# Patient Record
Sex: FEMALE | Race: WHITE
[De-identification: names, ages, dates, MRNs, and addresses within clinical notes are randomized per-mention and may not be internally consistent; named-entity substitution may affect disease eponyms.]

---

## 2020-08-28 ENCOUNTER — HOSPITAL ENCOUNTER (INPATIENT)
Dept: HOSPITAL 84 - D.ER | Age: 50
LOS: 4 days | Discharge: HOME | DRG: 90 | End: 2020-09-01
Attending: FAMILY MEDICINE | Admitting: FAMILY MEDICINE
Payer: COMMERCIAL

## 2020-08-28 VITALS
WEIGHT: 145.3 LBS | HEIGHT: 68 IN | BODY MASS INDEX: 22.02 KG/M2 | BODY MASS INDEX: 22.02 KG/M2 | HEIGHT: 68 IN | WEIGHT: 145.3 LBS

## 2020-08-28 DIAGNOSIS — Z91.81: ICD-10-CM
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
## 2020-08-28 DIAGNOSIS — R73.9: ICD-10-CM

## 2020-08-28 DIAGNOSIS — S06.0X9A: Primary | ICD-10-CM

## 2020-08-28 DIAGNOSIS — I10: ICD-10-CM

## 2020-08-28 DIAGNOSIS — S16.1XXA: ICD-10-CM

## 2020-08-28 DIAGNOSIS — M48.02: ICD-10-CM

## 2020-08-28 DIAGNOSIS — S01.01XA: ICD-10-CM

## 2020-08-29 VITALS — SYSTOLIC BLOOD PRESSURE: 142 MMHG | DIASTOLIC BLOOD PRESSURE: 84 MMHG

## 2020-08-29 VITALS — DIASTOLIC BLOOD PRESSURE: 92 MMHG | SYSTOLIC BLOOD PRESSURE: 152 MMHG

## 2020-08-29 VITALS — SYSTOLIC BLOOD PRESSURE: 138 MMHG | DIASTOLIC BLOOD PRESSURE: 84 MMHG

## 2020-08-29 VITALS — SYSTOLIC BLOOD PRESSURE: 152 MMHG | DIASTOLIC BLOOD PRESSURE: 92 MMHG

## 2020-08-29 VITALS — DIASTOLIC BLOOD PRESSURE: 86 MMHG | SYSTOLIC BLOOD PRESSURE: 132 MMHG

## 2020-08-29 VITALS — DIASTOLIC BLOOD PRESSURE: 87 MMHG | SYSTOLIC BLOOD PRESSURE: 150 MMHG

## 2020-08-29 VITALS — SYSTOLIC BLOOD PRESSURE: 154 MMHG | DIASTOLIC BLOOD PRESSURE: 94 MMHG

## 2020-08-29 VITALS — SYSTOLIC BLOOD PRESSURE: 158 MMHG | DIASTOLIC BLOOD PRESSURE: 90 MMHG

## 2020-08-29 VITALS — SYSTOLIC BLOOD PRESSURE: 146 MMHG | DIASTOLIC BLOOD PRESSURE: 93 MMHG

## 2020-08-29 LAB
ALBUMIN SERPL-MCNC: 3.9 G/DL (ref 3.4–5)
ALP SERPL-CCNC: 55 U/L (ref 30–120)
ALT SERPL-CCNC: 29 U/L (ref 10–68)
ANION GAP SERPL CALC-SCNC: 14 MMOL/L (ref 8–16)
APTT BLD: 25.2 SECONDS (ref 22.8–39.4)
BASOPHILS NFR BLD AUTO: 0.3 % (ref 0–2)
BILIRUB SERPL-MCNC: 0.38 MG/DL (ref 0.2–1.3)
BUN SERPL-MCNC: 13 MG/DL (ref 7–18)
CALCIUM SERPL-MCNC: 7.8 MG/DL (ref 8.5–10.1)
CHLORIDE SERPL-SCNC: 101 MMOL/L (ref 98–107)
CO2 SERPL-SCNC: 25.1 MMOL/L (ref 21–32)
CREAT SERPL-MCNC: 0.7 MG/DL (ref 0.6–1.3)
EOSINOPHIL NFR BLD: 1 % (ref 0–7)
ERYTHROCYTE [DISTWIDTH] IN BLOOD BY AUTOMATED COUNT: 12.7 % (ref 11.5–14.5)
GLOBULIN SER-MCNC: 3.4 G/L
GLUCOSE SERPL-MCNC: 99 MG/DL (ref 74–106)
HCT VFR BLD CALC: 36.2 % (ref 36–48)
HGB BLD-MCNC: 12.2 G/DL (ref 12–16)
IMM GRANULOCYTES NFR BLD: 0.3 % (ref 0–5)
INR PPP: 0.91 (ref 0.85–1.17)
LYMPHOCYTES NFR BLD AUTO: 28 % (ref 15–50)
MCH RBC QN AUTO: 33.4 PG (ref 26–34)
MCHC RBC AUTO-ENTMCNC: 33.7 G/DL (ref 31–37)
MCV RBC: 99.2 FL (ref 80–100)
MONOCYTES NFR BLD: 13.4 % (ref 2–11)
NEUTROPHILS NFR BLD AUTO: 57 % (ref 40–80)
OSMOLALITY SERPL CALC.SUM OF ELEC: 271 MOSM/KG (ref 275–300)
PLATELET # BLD: 287 10X3/UL (ref 130–400)
PMV BLD AUTO: 9.2 FL (ref 7.4–10.4)
POTASSIUM SERPL-SCNC: 4.1 MMOL/L (ref 3.5–5.1)
PROT SERPL-MCNC: 7.3 G/DL (ref 6.4–8.2)
PROTHROMBIN TIME: 12.2 SECONDS (ref 11.6–15)
RBC # BLD AUTO: 3.65 10X6/UL (ref 4–5.4)
SODIUM SERPL-SCNC: 136 MMOL/L (ref 136–145)
WBC # BLD AUTO: 7.9 10X3/UL (ref 4.8–10.8)

## 2020-08-29 PROCEDURE — 0HQ0XZZ REPAIR SCALP SKIN, EXTERNAL APPROACH: ICD-10-PCS | Performed by: FAMILY MEDICINE

## 2020-08-29 NOTE — NUR
RCV`D PT FROM ER VIA WHEELCHAIR AND HOSPITAL STAFF. ALERT AND ORIENTED WITH NO
CURRENT S/S OF DISTRESS. V/S STABLE. DENIES CURRENT NEEDS, WILL CONT TO
MONITOR.

## 2020-08-29 NOTE — NUR
CALL TO DR CONWAY FOR PAIN MED ON PT . MORPHINE NOT HOLDING HER , DR CONWAY STOPPED
THE MORPHINE AND ADDED DILAUDID Q 4 HOURS PRN PAIN.

## 2020-08-30 VITALS — DIASTOLIC BLOOD PRESSURE: 87 MMHG | SYSTOLIC BLOOD PRESSURE: 144 MMHG

## 2020-08-30 VITALS — SYSTOLIC BLOOD PRESSURE: 159 MMHG | DIASTOLIC BLOOD PRESSURE: 89 MMHG

## 2020-08-30 VITALS — DIASTOLIC BLOOD PRESSURE: 85 MMHG | SYSTOLIC BLOOD PRESSURE: 140 MMHG

## 2020-08-30 VITALS — DIASTOLIC BLOOD PRESSURE: 86 MMHG | SYSTOLIC BLOOD PRESSURE: 141 MMHG

## 2020-08-30 VITALS — SYSTOLIC BLOOD PRESSURE: 146 MMHG | DIASTOLIC BLOOD PRESSURE: 84 MMHG

## 2020-08-30 LAB
ANION GAP SERPL CALC-SCNC: 13.1 MMOL/L (ref 8–16)
BASOPHILS NFR BLD AUTO: 0.1 % (ref 0–2)
BUN SERPL-MCNC: 10 MG/DL (ref 7–18)
CALCIUM SERPL-MCNC: 8 MG/DL (ref 8.5–10.1)
CHLORIDE SERPL-SCNC: 102 MMOL/L (ref 98–107)
CO2 SERPL-SCNC: 23 MMOL/L (ref 21–32)
CREAT SERPL-MCNC: 0.9 MG/DL (ref 0.6–1.3)
EOSINOPHIL NFR BLD: 0 % (ref 0–7)
ERYTHROCYTE [DISTWIDTH] IN BLOOD BY AUTOMATED COUNT: 13.2 % (ref 11.5–14.5)
GLUCOSE SERPL-MCNC: 198 MG/DL (ref 74–106)
HCT VFR BLD CALC: 34.6 % (ref 36–48)
HGB BLD-MCNC: 11.6 G/DL (ref 12–16)
IMM GRANULOCYTES NFR BLD: 0.3 % (ref 0–5)
LYMPHOCYTES NFR BLD AUTO: 6 % (ref 15–50)
MAGNESIUM SERPL-MCNC: 2.1 MG/DL (ref 1.8–2.4)
MCH RBC QN AUTO: 33.7 PG (ref 26–34)
MCHC RBC AUTO-ENTMCNC: 33.5 G/DL (ref 31–37)
MCV RBC: 100.6 FL (ref 80–100)
MONOCYTES NFR BLD: 6.6 % (ref 2–11)
NEUTROPHILS NFR BLD AUTO: 87 % (ref 40–80)
OSMOLALITY SERPL CALC.SUM OF ELEC: 272 MOSM/KG (ref 275–300)
PLATELET # BLD: 281 10X3/UL (ref 130–400)
PMV BLD AUTO: 9.4 FL (ref 7.4–10.4)
POTASSIUM SERPL-SCNC: 4.1 MMOL/L (ref 3.5–5.1)
RBC # BLD AUTO: 3.44 10X6/UL (ref 4–5.4)
SODIUM SERPL-SCNC: 134 MMOL/L (ref 136–145)
WBC # BLD AUTO: 10.3 10X3/UL (ref 4.8–10.8)

## 2020-08-30 NOTE — NUR
A&O RESTING IN BED WITH EYES OPEN. NO C/O PAIN, DILAUDID PCA MANAGING PAIN AT
THIS TIME. NO S/S OF ACUTE DISTRESS NOTED. RENATA TO POSTERIOR CRAINIUM,
C/D/I. C-COLLAR ON. IV TO LEFT AC, NS INFUSING @ 75ML/HR. SITE PATENT WITHOUT
REDNESS OR SWELLING. DENIES ANY NEEDS AT THIS TIME. CALL LIGHT IN REACH. WILL
CONTINUE TO MONITOR.

## 2020-08-31 VITALS — DIASTOLIC BLOOD PRESSURE: 92 MMHG | SYSTOLIC BLOOD PRESSURE: 190 MMHG

## 2020-08-31 VITALS — SYSTOLIC BLOOD PRESSURE: 152 MMHG | DIASTOLIC BLOOD PRESSURE: 91 MMHG

## 2020-08-31 VITALS — SYSTOLIC BLOOD PRESSURE: 137 MMHG | DIASTOLIC BLOOD PRESSURE: 84 MMHG

## 2020-08-31 VITALS — SYSTOLIC BLOOD PRESSURE: 148 MMHG | DIASTOLIC BLOOD PRESSURE: 67 MMHG

## 2020-08-31 VITALS — DIASTOLIC BLOOD PRESSURE: 81 MMHG | SYSTOLIC BLOOD PRESSURE: 147 MMHG

## 2020-08-31 VITALS — SYSTOLIC BLOOD PRESSURE: 156 MMHG | DIASTOLIC BLOOD PRESSURE: 74 MMHG

## 2020-08-31 LAB
ANION GAP SERPL CALC-SCNC: 8.4 MMOL/L (ref 8–16)
BASOPHILS NFR BLD AUTO: 0 % (ref 0–2)
BUN SERPL-MCNC: 12 MG/DL (ref 7–18)
CALCIUM SERPL-MCNC: 8.6 MG/DL (ref 8.5–10.1)
CHLORIDE SERPL-SCNC: 103 MMOL/L (ref 98–107)
CO2 SERPL-SCNC: 29.7 MMOL/L (ref 21–32)
CREAT SERPL-MCNC: 0.7 MG/DL (ref 0.6–1.3)
EOSINOPHIL NFR BLD: 0 % (ref 0–7)
ERYTHROCYTE [DISTWIDTH] IN BLOOD BY AUTOMATED COUNT: 13.6 % (ref 11.5–14.5)
GLUCOSE SERPL-MCNC: 115 MG/DL (ref 74–106)
HCT VFR BLD CALC: 35.8 % (ref 36–48)
HGB BLD-MCNC: 11.4 G/DL (ref 12–16)
IMM GRANULOCYTES NFR BLD: 0.4 % (ref 0–5)
LYMPHOCYTES NFR BLD AUTO: 7.1 % (ref 15–50)
MAGNESIUM SERPL-MCNC: 2.2 MG/DL (ref 1.8–2.4)
MCH RBC QN AUTO: 32.5 PG (ref 26–34)
MCHC RBC AUTO-ENTMCNC: 31.8 G/DL (ref 31–37)
MCV RBC: 102 FL (ref 80–100)
MONOCYTES NFR BLD: 8.6 % (ref 2–11)
NEUTROPHILS NFR BLD AUTO: 83.9 % (ref 40–80)
OSMOLALITY SERPL CALC.SUM OF ELEC: 274 MOSM/KG (ref 275–300)
PLATELET # BLD: 287 10X3/UL (ref 130–400)
PMV BLD AUTO: 9.5 FL (ref 7.4–10.4)
POTASSIUM SERPL-SCNC: 4.1 MMOL/L (ref 3.5–5.1)
RBC # BLD AUTO: 3.51 10X6/UL (ref 4–5.4)
SODIUM SERPL-SCNC: 137 MMOL/L (ref 136–145)
WBC # BLD AUTO: 12.8 10X3/UL (ref 4.8–10.8)

## 2020-08-31 NOTE — NUR
CALL TO DR YULI CHOWDHURY. PATIENT WANTS TO FINISH PCA VIAL IN PAIN PUMP BEFORE MED
DCD.ORDERS RECIEVED AND INITIATED

## 2020-08-31 NOTE — NUR
ASSESSMENT PER FLOW SHEET. PATIENT IS WITHOUT DISTRESS,BUT COMPLAINS OF PAIN
10/10 SCALE. MEDS PER MAR.CALL LIGHT IN REACH

## 2020-09-01 VITALS — DIASTOLIC BLOOD PRESSURE: 86 MMHG | SYSTOLIC BLOOD PRESSURE: 155 MMHG

## 2020-09-01 VITALS — SYSTOLIC BLOOD PRESSURE: 167 MMHG | DIASTOLIC BLOOD PRESSURE: 86 MMHG

## 2020-09-01 LAB
ANION GAP SERPL CALC-SCNC: 9.7 MMOL/L (ref 8–16)
BASOPHILS NFR BLD AUTO: 0 % (ref 0–2)
BUN SERPL-MCNC: 14 MG/DL (ref 7–18)
CALCIUM SERPL-MCNC: 8.1 MG/DL (ref 8.5–10.1)
CHLORIDE SERPL-SCNC: 104 MMOL/L (ref 98–107)
CO2 SERPL-SCNC: 26.1 MMOL/L (ref 21–32)
CREAT SERPL-MCNC: 0.7 MG/DL (ref 0.6–1.3)
EOSINOPHIL NFR BLD: 0 % (ref 0–7)
ERYTHROCYTE [DISTWIDTH] IN BLOOD BY AUTOMATED COUNT: 13.1 % (ref 11.5–14.5)
GLUCOSE SERPL-MCNC: 134 MG/DL (ref 74–106)
HCT VFR BLD CALC: 32.8 % (ref 36–48)
HGB BLD-MCNC: 10.6 G/DL (ref 12–16)
IMM GRANULOCYTES NFR BLD: 0.3 % (ref 0–5)
LYMPHOCYTES NFR BLD AUTO: 6.6 % (ref 15–50)
MAGNESIUM SERPL-MCNC: 2 MG/DL (ref 1.8–2.4)
MCH RBC QN AUTO: 32.7 PG (ref 26–34)
MCHC RBC AUTO-ENTMCNC: 32.3 G/DL (ref 31–37)
MCV RBC: 101.2 FL (ref 80–100)
MONOCYTES NFR BLD: 7.6 % (ref 2–11)
NEUTROPHILS NFR BLD AUTO: 85.5 % (ref 40–80)
OSMOLALITY SERPL CALC.SUM OF ELEC: 274 MOSM/KG (ref 275–300)
PLATELET # BLD: 264 10X3/UL (ref 130–400)
PMV BLD AUTO: 9.6 FL (ref 7.4–10.4)
POTASSIUM SERPL-SCNC: 3.8 MMOL/L (ref 3.5–5.1)
RBC # BLD AUTO: 3.24 10X6/UL (ref 4–5.4)
SODIUM SERPL-SCNC: 136 MMOL/L (ref 136–145)
WBC # BLD AUTO: 10.8 10X3/UL (ref 4.8–10.8)

## 2020-09-01 NOTE — NUR
IV DCD WITH CATH TIP INTACT. DISCHARGE INSTRUCTIONS,STATES UNDERSTANDING.
WAITING ON RIDE TO RETURN FOR TRANSPORT HOME

## 2024-06-17 ENCOUNTER — HOSPITAL ENCOUNTER (EMERGENCY)
Facility: HOSPITAL | Age: 54
Discharge: HOME | End: 2024-06-17
Attending: EMERGENCY MEDICINE
Payer: COMMERCIAL

## 2024-06-17 VITALS
SYSTOLIC BLOOD PRESSURE: 143 MMHG | TEMPERATURE: 98 F | RESPIRATION RATE: 18 BRPM | OXYGEN SATURATION: 100 % | DIASTOLIC BLOOD PRESSURE: 85 MMHG | BODY MASS INDEX: 20.4 KG/M2 | HEART RATE: 67 BPM | WEIGHT: 130 LBS | HEIGHT: 67 IN

## 2024-06-17 DIAGNOSIS — R20.2 PARESTHESIA: ICD-10-CM

## 2024-06-17 DIAGNOSIS — S00.01XA ABRASION OF SCALP, INITIAL ENCOUNTER: ICD-10-CM

## 2024-06-17 DIAGNOSIS — Z86.79 HISTORY OF HYPERTENSION: ICD-10-CM

## 2024-06-17 DIAGNOSIS — S09.90XA CLOSED HEAD INJURY, INITIAL ENCOUNTER: ICD-10-CM

## 2024-06-17 DIAGNOSIS — I10 SYSTOLIC HYPERTENSION: ICD-10-CM

## 2024-06-17 DIAGNOSIS — S00.03XA CONTUSION OF SCALP, INITIAL ENCOUNTER: Primary | ICD-10-CM

## 2024-06-17 DIAGNOSIS — M62.830 MUSCLE SPASM OF BACK: ICD-10-CM

## 2024-06-17 PROCEDURE — 99284 EMERGENCY DEPT VISIT MOD MDM: CPT | Mod: 25

## 2024-06-17 PROCEDURE — 25000003 PHARM REV CODE 250: Performed by: EMERGENCY MEDICINE

## 2024-06-17 RX ORDER — ACETAMINOPHEN 500 MG
1000 TABLET ORAL
Status: COMPLETED | OUTPATIENT
Start: 2024-06-17 | End: 2024-06-17

## 2024-06-17 RX ORDER — CYCLOBENZAPRINE HCL 10 MG
10 TABLET ORAL 3 TIMES DAILY PRN
Qty: 15 TABLET | Refills: 0 | Status: SHIPPED | OUTPATIENT
Start: 2024-06-17 | End: 2024-06-20

## 2024-06-17 RX ADMIN — ACETAMINOPHEN 1000 MG: 500 TABLET ORAL at 07:06

## 2024-06-17 NOTE — DISCHARGE INSTRUCTIONS
Imaging Results              CT Cervical Spine Without Contrast (Final result)  Result time 06/17/24 08:16:38      Final result by Mio Solano MD (06/17/24 08:16:38)                   Impression:      No fracture or traumatic malalignment of the cervical spine.    Prior C5-C7 ACDF.    Multilevel ossification of posterior longitudinal ligament.    Mild thyromegaly.      Electronically signed by: Mio Solano MD  Date:    06/17/2024  Time:    08:16               Narrative:    EXAMINATION:  CT CERVICAL SPINE WITHOUT CONTRAST    CLINICAL HISTORY:  Neck trauma, midline tenderness (Age 16-64y);    TECHNIQUE:  Low dose axial CT images through the cervical spine, with sagittal and coronal reformations.  Contrast was not administered.    COMPARISON:  No priors    FINDINGS:  Prior C6 corpectomy with anterior cervical discectomy fusion procedure spanning C5 through C7.  The orthopedic hardware in good position and well seated without surrounding lucency.  There is evidence of osseous fusion across the C5 through C7 vertebral bodies.    No evidence of an acute displaced fracture or osseous destructive process.    No significant spondylolisthesis.    Intervertebral disc heights are well maintained.  Ossification of posterior longitudinal ligament spanning from C4 at C4 and C5 as well as T1 through T3.  Mild spinal canal encroachment.  No evidence of high-grade neural foraminal narrowing..    Limited evaluation of the intraspinal contents demonstrates no hematoma or mass.    Paraspinal soft tissues exhibit no acute abnormalities.  Thyroid gland mildly diffusely enlarged.                                       CT Head Without Contrast (Final result)  Result time 06/17/24 08:12:43      Final result by Mio Solano MD (06/17/24 08:12:43)                   Impression:      No evidence of acute intracranial hemorrhage.    Mild chronic small vessel ischemic change in the supratentorial white matter.      Electronically  signed by: Mio Solano MD  Date:    06/17/2024  Time:    08:12               Narrative:    EXAMINATION:  CT HEAD WITHOUT CONTRAST    CLINICAL HISTORY:  Facial trauma, blunt;    TECHNIQUE:  Low dose axial CT images obtained throughout the head without the use of intravenous contrast.  Axial, sagittal and coronal reconstructions were performed.    COMPARISON:  None.    FINDINGS:  Intracranial compartment:    Ventricles and sulci are normal in size for age without evidence of hydrocephalus.    Patchy symmetric hypoattenuation in the supratentorial white matter, nonspecific but most likely reflecting chronic small vessel ischemic changes. No recent or remote major vascular distribution infarct. No acute hemorrhage.  No mass effect or midline shift.    No extra-axial blood or fluid collections.    Skull/extracranial contents (limited evaluation):    No displaced calvarial fracture.    The mastoid air cells and visualized paranasal sinuses are essentially clear.

## 2024-06-17 NOTE — ED PROVIDER NOTES
"Encounter Date: 6/17/2024       History     Chief Complaint   Patient presents with    Fall     Pt states she had a fall yesterday and hit her head on a door knob and is not experiencing some pain to her head and some tingling in her right arm. -LOC, -blood thinners     HPI  55 Y/O F presents with  C/O "not feeling herself" intermittent global nonradiating headache and reported tingling to right upper extremity status post striking head with door knob 24 hours ago.  She denies any loss of consciousness, loss of postural tone, visual loss/changes, inability to ambulate numbness or weakness and tolerating p.o. at baseline.  She denies any use of vitamin K antagonist, direct oral anticoagulant or dual antiplatelet.  She is stroke her head while bending over and upon rising to standing struck calvarium.  No reported tear of skin or bleeding from scalp.  She reports right upper back nonradiating pain and spasms.  She reports running out of her Robaxin, which she was prescribed during a previous cervical intervention.    Review of patient's allergies indicates:  No Known Allergies  History reviewed. No pertinent past medical history.  History reviewed. No pertinent surgical history.  No family history on file.     Review of Systems    Physical Exam     Initial Vitals [06/17/24 0616]   BP Pulse Resp Temp SpO2   (!) 143/89 72 18 97.9 °F (36.6 °C) 100 %      MAP       --         Physical Exam  GENERAL:  Slightly anxious, but Well-appearing and Non-Toxic; Well-Nourished; NAD.  HEENT: AT/NC; PERRL, EOMI, Acuity & Fields WNL; TA region with no TTP, no nodularity or pulse asymmetry to TA; speaking full sentences with no drooling.  NECK: Supple, FROM with no meningismus, no accessory muscle use. No carotid bruits B/L.  HEART: Regular rate and rhythm, no M/G/T.   LUNGS: No Tachypnea, No Retractions, and CTA B/L with no W/R/R.  ABDOMEN: Soft, ND, NTTP. No rigidity. No guarding.   BACK: Atraumatic, No midline TTP to C/T/LS " spine with + TTP over right supraspinatus and right cervical paravertebral muscle eliciting/reproducing described pain; No CVA tenderness B/L. SLRT NEG.  EXTREMITIES: FROM.   SKIN: Warm, Dry, No Skin Tears or Rashes.  VASCULAR: 2+ pulses Prox/Dist &amp; Symm with no delay.  NEUROLOGIC: AAOx3, No Receptive or Expressive Aphasia, Answering Questions Appropriately, Recent & Remote Memory Intact, No Visual or Tactile Agnosia to B/L UE; CN/PN Intact, Strength:  5/5 at bilateral shrugs, bicep, tricep, abduction and adduction and shoulder joint with symmetrical strength; Sens Symmetrical to UE & LE, No Ataxia.    ED Course   Procedures  Labs Reviewed - No data to display       Imaging Results              CT Cervical Spine Without Contrast (Final result)  Result time 06/17/24 08:16:38      Final result by Mio Solano MD (06/17/24 08:16:38)                   Impression:      No fracture or traumatic malalignment of the cervical spine.    Prior C5-C7 ACDF.    Multilevel ossification of posterior longitudinal ligament.    Mild thyromegaly.      Electronically signed by: Mio Solano MD  Date:    06/17/2024  Time:    08:16               Narrative:    EXAMINATION:  CT CERVICAL SPINE WITHOUT CONTRAST    CLINICAL HISTORY:  Neck trauma, midline tenderness (Age 16-64y);    TECHNIQUE:  Low dose axial CT images through the cervical spine, with sagittal and coronal reformations.  Contrast was not administered.    COMPARISON:  No priors    FINDINGS:  Prior C6 corpectomy with anterior cervical discectomy fusion procedure spanning C5 through C7.  The orthopedic hardware in good position and well seated without surrounding lucency.  There is evidence of osseous fusion across the C5 through C7 vertebral bodies.    No evidence of an acute displaced fracture or osseous destructive process.    No significant spondylolisthesis.    Intervertebral disc heights are well maintained.  Ossification of posterior longitudinal ligament  spanning from C4 at C4 and C5 as well as T1 through T3.  Mild spinal canal encroachment.  No evidence of high-grade neural foraminal narrowing..    Limited evaluation of the intraspinal contents demonstrates no hematoma or mass.    Paraspinal soft tissues exhibit no acute abnormalities.  Thyroid gland mildly diffusely enlarged.                                       CT Head Without Contrast (Final result)  Result time 06/17/24 08:12:43      Final result by Mio Solano MD (06/17/24 08:12:43)                   Impression:      No evidence of acute intracranial hemorrhage.    Mild chronic small vessel ischemic change in the supratentorial white matter.      Electronically signed by: Mio Solano MD  Date:    06/17/2024  Time:    08:12               Narrative:    EXAMINATION:  CT HEAD WITHOUT CONTRAST    CLINICAL HISTORY:  Facial trauma, blunt;    TECHNIQUE:  Low dose axial CT images obtained throughout the head without the use of intravenous contrast.  Axial, sagittal and coronal reconstructions were performed.    COMPARISON:  None.    FINDINGS:  Intracranial compartment:    Ventricles and sulci are normal in size for age without evidence of hydrocephalus.    Patchy symmetric hypoattenuation in the supratentorial white matter, nonspecific but most likely reflecting chronic small vessel ischemic changes. No recent or remote major vascular distribution infarct. No acute hemorrhage.  No mass effect or midline shift.    No extra-axial blood or fluid collections.    Skull/extracranial contents (limited evaluation):    No displaced calvarial fracture.    The mastoid air cells and visualized paranasal sinuses are essentially clear.                                       Medications   acetaminophen tablet 1,000 mg (1,000 mg Oral Given 6/17/24 0720)     Medical Decision Making   Well appearing, hemodynamically stable neurovascularly intact female presenting with right upper extremity paresthesias over the right upper arm  region likely secondary to pain.  No cranial nerve  deficits.  Slight abrasion over the superior frontal region at the hairline of the proximally 1 cm with no laceration that would warrant repair.  Tetanus booster up-to-date as it was received in  when she had anterior approach to her cervical surgical procedure.  She took Higginbotham inhibitors/ ASA  with the last 2 hours of ED presentation.  Will provide acetaminophen.  Discussed muscle relaxants.  ____________________  Yared Esteban MD, [facility]  Emergency Medicine Staff  7:16 AM 2024    F/U:   Patient feeling well.  We discussed extensively her results in the red flags that would warrant emergent MRI and emergent further intervention.  We discussed need to establish physicians here in [place] and I provide her sports medicine outpatient follow-up and discussed extensively symptoms warranting ED return, which she and  acknowledged. Patient requested a few days of Flexeril, which I gladly prescribed despite lack of evidence  ____________________  Yared Esteban MD, [facility]  Emergency Medicine Staff  8:54 AM 2024        Amount and/or Complexity of Data Reviewed  Radiology: ordered.    Risk  OTC drugs.  Prescription drug management.                                      Clinical Impression:  Final diagnoses:  [S00.03XA] Contusion of scalp, initial encounter (Primary)  [R20.2] Paresthesia  [M62.830] Muscle spasm of back  [S09.90XA] Closed head injury, initial encounter  [S00.01XA] Abrasion of scalp, initial encounter  [Z86.79] History of hypertension  [I10] Systolic hypertension          ED Disposition Condition    Discharge Stable          ED Prescriptions       Medication Sig Dispense Start Date End Date Auth. Provider    cyclobenzaprine (FLEXERIL) 10 MG tablet () Take 1 tablet (10 mg total) by mouth 3 (three) times daily as needed for Muscle spasms. 15 tablet 2024 Aamir Esteban MD          Follow-up Information       Follow up  With Specialties Details Why Contact [facility] - Emergency Dept Emergency Medicine  If symptoms worsen [address]  [facility] [address] [phone]             Aamir Esteban MD  06/22/24 0825

## 2024-06-17 NOTE — ED NOTES
Pt reports she fell down 24 hours ago. Pt stated she fell and hit her head on the door knob. Pt denies LOC. +numbness and tingling in RUE. Pt also reports she is having pain that starts in the back of her neck and radiates down her right arm.     Adult Physical Assessment  LOC: Anabela Villanueva, 54 y.o. female verified via two identifiers.  The patient is awake, alert, oriented and speaking appropriately at this time.  APPEARANCE: Patient resting comfortably and appears to be in no acute distress at this time. Patient is clean and well groomed, patient's clothing is properly fastened.  SKIN:The skin is warm and dry, color consistent with ethnicity, patient has normal skin turgor and moist mucus membranes, skin intact, no breakdown or brusing noted.  MUSCULOSKELETAL: Patient moving all extremities well, no obvious swelling or deformities noted. Generalized weakness. Right arm pain. Pt reports numbness and tingling in RUE.   RESPIRATORY: Airway is open and patent, respirations are spontaneous, patient has a normal effort and rate, no accessory muscle use noted.  CARDIAC: Patient has a normal rate and rhythm, no periphreal edema noted in any extremity, capillary refill < 3 seconds in all extremities  ABDOMEN: Soft and non tender to palpation, no abdominal distention noted. Bowel sounds present in all four quadrants.  NEUROLOGIC: Eyes open spontaneously, behavior appropriate to situation, follows commands, facial expression symmetrical, bilateral hand grasp equal and even, purposeful motor response noted, normal sensation in all extremities when touched with a finger.